# Patient Record
Sex: MALE | Race: ASIAN | NOT HISPANIC OR LATINO | URBAN - METROPOLITAN AREA
[De-identification: names, ages, dates, MRNs, and addresses within clinical notes are randomized per-mention and may not be internally consistent; named-entity substitution may affect disease eponyms.]

---

## 2017-10-18 ENCOUNTER — EMERGENCY (EMERGENCY)
Facility: HOSPITAL | Age: 62
LOS: 1 days | End: 2017-10-18
Attending: EMERGENCY MEDICINE
Payer: MEDICAID

## 2017-10-18 VITALS — OXYGEN SATURATION: 93 % | RESPIRATION RATE: 22 BRPM

## 2017-10-18 DIAGNOSIS — Z98.89 OTHER SPECIFIED POSTPROCEDURAL STATES: Chronic | ICD-10-CM

## 2017-10-18 DIAGNOSIS — I46.9 CARDIAC ARREST, CAUSE UNSPECIFIED: ICD-10-CM

## 2017-10-18 LAB
ALBUMIN SERPL ELPH-MCNC: 2.1 G/DL — LOW (ref 3.3–5)
ALBUMIN SERPL ELPH-MCNC: 3.3 G/DL — SIGNIFICANT CHANGE UP (ref 3.3–5)
ALP SERPL-CCNC: 72 U/L — SIGNIFICANT CHANGE UP (ref 40–120)
ALP SERPL-CCNC: 82 U/L — SIGNIFICANT CHANGE UP (ref 40–120)
ALT FLD-CCNC: 1029 U/L — HIGH (ref 4–41)
ALT FLD-CCNC: 886 U/L — HIGH (ref 4–41)
APTT BLD: 110 SEC — HIGH (ref 27.5–37.4)
AST SERPL-CCNC: 1260 U/L — HIGH (ref 4–40)
AST SERPL-CCNC: 1432 U/L — HIGH (ref 4–40)
BASE EXCESS BLDA CALC-SCNC: -14.5 MMOL/L — SIGNIFICANT CHANGE UP
BASE EXCESS BLDV CALC-SCNC: -11.8 MMOL/L — SIGNIFICANT CHANGE UP
BASE EXCESS BLDV CALC-SCNC: -21.8 MMOL/L — SIGNIFICANT CHANGE UP
BASOPHILS # BLD AUTO: 0.04 K/UL — SIGNIFICANT CHANGE UP (ref 0–0.2)
BASOPHILS NFR BLD AUTO: 0.8 % — SIGNIFICANT CHANGE UP (ref 0–2)
BILIRUB SERPL-MCNC: 0.8 MG/DL — SIGNIFICANT CHANGE UP (ref 0.2–1.2)
BILIRUB SERPL-MCNC: 1.1 MG/DL — SIGNIFICANT CHANGE UP (ref 0.2–1.2)
BLOOD GAS VENOUS - CREATININE: 0.92 MG/DL — SIGNIFICANT CHANGE UP (ref 0.5–1.3)
BLOOD GAS VENOUS - CREATININE: 0.97 MG/DL — SIGNIFICANT CHANGE UP (ref 0.5–1.3)
BUN SERPL-MCNC: 14 MG/DL — SIGNIFICANT CHANGE UP (ref 7–23)
BUN SERPL-MCNC: 17 MG/DL — SIGNIFICANT CHANGE UP (ref 7–23)
CALCIUM SERPL-MCNC: 10.6 MG/DL — HIGH (ref 8.4–10.5)
CALCIUM SERPL-MCNC: 10.9 MG/DL — HIGH (ref 8.4–10.5)
CHLORIDE BLDA-SCNC: 106 MMOL/L — SIGNIFICANT CHANGE UP (ref 96–108)
CHLORIDE BLDV-SCNC: 104 MMOL/L — SIGNIFICANT CHANGE UP (ref 96–108)
CHLORIDE BLDV-SCNC: 117 MMOL/L — HIGH (ref 96–108)
CHLORIDE SERPL-SCNC: 104 MMOL/L — SIGNIFICANT CHANGE UP (ref 98–107)
CHLORIDE SERPL-SCNC: 93 MMOL/L — LOW (ref 98–107)
CK MB BLD-MCNC: 13.37 NG/ML — HIGH (ref 1–6.6)
CK SERPL-CCNC: 352 U/L — HIGH (ref 30–200)
CO2 SERPL-SCNC: 6 MMOL/L — CRITICAL LOW (ref 22–31)
CO2 SERPL-SCNC: 8 MMOL/L — CRITICAL LOW (ref 22–31)
CREAT BLDA-MCNC: 1.4 MG/DL — HIGH (ref 0.5–1.3)
CREAT SERPL-MCNC: 1.24 MG/DL — SIGNIFICANT CHANGE UP (ref 0.5–1.3)
CREAT SERPL-MCNC: 1.47 MG/DL — HIGH (ref 0.5–1.3)
EOSINOPHIL # BLD AUTO: 0.02 K/UL — SIGNIFICANT CHANGE UP (ref 0–0.5)
EOSINOPHIL NFR BLD AUTO: 0.4 % — SIGNIFICANT CHANGE UP (ref 0–6)
GAS PNL BLDV: 134 MMOL/L — LOW (ref 136–146)
GAS PNL BLDV: 140 MMOL/L — SIGNIFICANT CHANGE UP (ref 136–146)
GLUCOSE BLDA-MCNC: 534 MG/DL — CRITICAL HIGH (ref 70–99)
GLUCOSE BLDV-MCNC: 188 — HIGH (ref 70–99)
GLUCOSE BLDV-MCNC: 340 — HIGH (ref 70–99)
GLUCOSE SERPL-MCNC: 286 MG/DL — HIGH (ref 70–99)
GLUCOSE SERPL-MCNC: 376 MG/DL — HIGH (ref 70–99)
HCO3 BLDA-SCNC: 12 MMOL/L — LOW (ref 22–26)
HCO3 BLDV-SCNC: 13 MMOL/L — LOW (ref 20–27)
HCO3 BLDV-SCNC: 8 MMOL/L — LOW (ref 20–27)
HCT VFR BLD CALC: 37.2 % — LOW (ref 39–50)
HCT VFR BLDA CALC: 28 % — LOW (ref 39–51)
HCT VFR BLDV CALC: 23.1 % — LOW (ref 39–51)
HCT VFR BLDV CALC: 36.9 % — LOW (ref 39–51)
HGB BLD-MCNC: 11 G/DL — LOW (ref 13–17)
HGB BLDA-MCNC: 9 G/DL — LOW (ref 13–17)
HGB BLDV-MCNC: 12 G/DL — LOW (ref 13–17)
HGB BLDV-MCNC: 7.4 G/DL — LOW (ref 13–17)
IMM GRANULOCYTES # BLD AUTO: 0.3 # — SIGNIFICANT CHANGE UP
IMM GRANULOCYTES NFR BLD AUTO: 5.7 % — HIGH (ref 0–1.5)
INR BLD: 1.17 — SIGNIFICANT CHANGE UP (ref 0.88–1.17)
LACTATE BLDA-SCNC: 18 MMOL/L — CRITICAL HIGH (ref 0.5–2)
LACTATE BLDV-MCNC: 15 MMOL/L — CRITICAL HIGH (ref 0.5–2)
LACTATE BLDV-MCNC: 9.8 MMOL/L — CRITICAL HIGH (ref 0.5–2)
LYMPHOCYTES # BLD AUTO: 3.27 K/UL — SIGNIFICANT CHANGE UP (ref 1–3.3)
LYMPHOCYTES # BLD AUTO: 62.6 % — HIGH (ref 13–44)
MCHC RBC-ENTMCNC: 29.6 % — LOW (ref 32–36)
MCHC RBC-ENTMCNC: 30.7 PG — SIGNIFICANT CHANGE UP (ref 27–34)
MCV RBC AUTO: 103.9 FL — HIGH (ref 80–100)
MONOCYTES # BLD AUTO: 0.27 K/UL — SIGNIFICANT CHANGE UP (ref 0–0.9)
MONOCYTES NFR BLD AUTO: 5.2 % — SIGNIFICANT CHANGE UP (ref 2–14)
NEUTROPHILS # BLD AUTO: 1.32 K/UL — LOW (ref 1.8–7.4)
NEUTROPHILS NFR BLD AUTO: 25.3 % — LOW (ref 43–77)
NRBC # FLD: 0.06 — SIGNIFICANT CHANGE UP
NRBC FLD-RTO: 1.1 — SIGNIFICANT CHANGE UP
PCO2 BLDA: 66 MMHG — HIGH (ref 35–48)
PCO2 BLDV: 56 MMHG — HIGH (ref 41–51)
PCO2 BLDV: 74 MMHG — HIGH (ref 41–51)
PH BLDA: 6.99 PH — CRITICAL LOW (ref 7.35–7.45)
PH BLDV: 6.85 PH — CRITICAL LOW (ref 7.32–7.43)
PH BLDV: 7.01 PH — CRITICAL LOW (ref 7.32–7.43)
PLATELET # BLD AUTO: 96 K/UL — LOW (ref 150–400)
PLATELET COUNT - ESTIMATE: SIGNIFICANT CHANGE UP
PMV BLD: 11.1 FL — SIGNIFICANT CHANGE UP (ref 7–13)
PO2 BLDA: 35 MMHG — CRITICAL LOW (ref 83–108)
PO2 BLDV: 37 MMHG — SIGNIFICANT CHANGE UP (ref 35–40)
PO2 BLDV: 69 MMHG — HIGH (ref 35–40)
POTASSIUM BLDA-SCNC: 5.5 MMOL/L — HIGH (ref 3.4–4.5)
POTASSIUM BLDV-SCNC: 5.4 MMOL/L — HIGH (ref 3.4–4.5)
POTASSIUM BLDV-SCNC: 5.6 MMOL/L — HIGH (ref 3.4–4.5)
POTASSIUM SERPL-MCNC: 6.9 MMOL/L — CRITICAL HIGH (ref 3.5–5.3)
POTASSIUM SERPL-MCNC: 8.7 MMOL/L — CRITICAL HIGH (ref 3.5–5.3)
POTASSIUM SERPL-SCNC: 6.9 MMOL/L — CRITICAL HIGH (ref 3.5–5.3)
POTASSIUM SERPL-SCNC: 8.7 MMOL/L — CRITICAL HIGH (ref 3.5–5.3)
PROT SERPL-MCNC: 3.8 G/DL — LOW (ref 6–8.3)
PROT SERPL-MCNC: 6 G/DL — SIGNIFICANT CHANGE UP (ref 6–8.3)
PROTHROM AB SERPL-ACNC: 13.1 SEC — SIGNIFICANT CHANGE UP (ref 9.8–13.1)
RBC # BLD: 3.58 M/UL — LOW (ref 4.2–5.8)
RBC # FLD: 16.6 % — HIGH (ref 10.3–14.5)
SAO2 % BLDA: 32.4 % — LOW (ref 95–99)
SAO2 % BLDV: 47 % — LOW (ref 60–85)
SAO2 % BLDV: 71.2 % — SIGNIFICANT CHANGE UP (ref 60–85)
SODIUM BLDA-SCNC: 139 MMOL/L — SIGNIFICANT CHANGE UP (ref 136–146)
SODIUM SERPL-SCNC: 142 MMOL/L — SIGNIFICANT CHANGE UP (ref 135–145)
SODIUM SERPL-SCNC: 142 MMOL/L — SIGNIFICANT CHANGE UP (ref 135–145)
TROPONIN T SERPL-MCNC: < 0.06 NG/ML — SIGNIFICANT CHANGE UP (ref 0–0.06)
WBC # BLD: 5.22 K/UL — SIGNIFICANT CHANGE UP (ref 3.8–10.5)
WBC # FLD AUTO: 5.22 K/UL — SIGNIFICANT CHANGE UP (ref 3.8–10.5)

## 2017-10-18 PROCEDURE — 71010: CPT | Mod: 26

## 2017-10-18 PROCEDURE — 93010 ELECTROCARDIOGRAM REPORT: CPT

## 2017-10-18 PROCEDURE — 99285 EMERGENCY DEPT VISIT HI MDM: CPT | Mod: 25

## 2017-10-18 RX ORDER — EPINEPHRINE 0.3 MG/.3ML
1 INJECTION INTRAMUSCULAR; SUBCUTANEOUS ONCE
Qty: 0 | Refills: 0 | Status: COMPLETED | OUTPATIENT
Start: 2017-10-18 | End: 2017-10-18

## 2017-10-18 RX ORDER — NOREPINEPHRINE BITARTRATE/D5W 8 MG/250ML
0.1 PLASTIC BAG, INJECTION (ML) INTRAVENOUS
Qty: 8 | Refills: 0 | Status: DISCONTINUED | OUTPATIENT
Start: 2017-10-18 | End: 2017-10-18

## 2017-10-18 RX ORDER — ATROPINE SULFATE 0.1 MG/ML
0.5 SYRINGE (ML) INJECTION ONCE
Qty: 0 | Refills: 0 | Status: COMPLETED | OUTPATIENT
Start: 2017-10-18 | End: 2017-10-18

## 2017-10-18 RX ORDER — CALCIUM GLUCONATE 100 MG/ML
1 VIAL (ML) INTRAVENOUS ONCE
Qty: 0 | Refills: 0 | Status: COMPLETED | OUTPATIENT
Start: 2017-10-18 | End: 2017-10-18

## 2017-10-18 RX ORDER — SODIUM CHLORIDE 9 MG/ML
2000 INJECTION INTRAMUSCULAR; INTRAVENOUS; SUBCUTANEOUS ONCE
Qty: 0 | Refills: 0 | Status: COMPLETED | OUTPATIENT
Start: 2017-10-18 | End: 2017-10-18

## 2017-10-18 RX ORDER — CALCIUM CHLORIDE
1000 POWDER (GRAM) MISCELLANEOUS ONCE
Qty: 0 | Refills: 0 | Status: COMPLETED | OUTPATIENT
Start: 2017-10-18 | End: 2017-10-18

## 2017-10-18 RX ORDER — DEXTROSE 50 % IN WATER 50 %
50 SYRINGE (ML) INTRAVENOUS ONCE
Qty: 0 | Refills: 0 | Status: COMPLETED | OUTPATIENT
Start: 2017-10-18 | End: 2017-10-18

## 2017-10-18 RX ORDER — INSULIN HUMAN 100 [IU]/ML
5 INJECTION, SOLUTION SUBCUTANEOUS ONCE
Qty: 0 | Refills: 0 | Status: COMPLETED | OUTPATIENT
Start: 2017-10-18 | End: 2017-10-18

## 2017-10-18 RX ORDER — AMIODARONE HYDROCHLORIDE 400 MG/1
0.5 TABLET ORAL
Qty: 450 | Refills: 0 | Status: DISCONTINUED | OUTPATIENT
Start: 2017-10-19 | End: 2017-10-18

## 2017-10-18 RX ORDER — SODIUM BICARBONATE 1 MEQ/ML
100 SYRINGE (ML) INTRAVENOUS ONCE
Qty: 0 | Refills: 0 | Status: COMPLETED | OUTPATIENT
Start: 2017-10-18 | End: 2017-10-18

## 2017-10-18 RX ORDER — INFLUENZA VIRUS VACCINE 15; 15; 15; 15 UG/.5ML; UG/.5ML; UG/.5ML; UG/.5ML
0.5 SUSPENSION INTRAMUSCULAR ONCE
Qty: 0 | Refills: 0 | Status: DISCONTINUED | OUTPATIENT
Start: 2017-10-18 | End: 2017-10-18

## 2017-10-18 RX ORDER — NOREPINEPHRINE BITARTRATE/D5W 8 MG/250ML
0.1 PLASTIC BAG, INJECTION (ML) INTRAVENOUS
Qty: 16 | Refills: 0 | Status: DISCONTINUED | OUTPATIENT
Start: 2017-10-18 | End: 2017-10-18

## 2017-10-18 RX ORDER — SODIUM BICARBONATE 1 MEQ/ML
50 SYRINGE (ML) INTRAVENOUS ONCE
Qty: 0 | Refills: 0 | Status: COMPLETED | OUTPATIENT
Start: 2017-10-18 | End: 2017-10-18

## 2017-10-18 RX ORDER — CALCIUM GLUCONATE 100 MG/ML
1 VIAL (ML) INTRAVENOUS ONCE
Qty: 0 | Refills: 0 | Status: DISCONTINUED | OUTPATIENT
Start: 2017-10-18 | End: 2017-10-18

## 2017-10-18 RX ORDER — AMIODARONE HYDROCHLORIDE 400 MG/1
1 TABLET ORAL
Qty: 450 | Refills: 0 | Status: DISCONTINUED | OUTPATIENT
Start: 2017-10-18 | End: 2017-10-18

## 2017-10-18 RX ADMIN — Medication 100 MILLIEQUIVALENT(S): at 18:00

## 2017-10-18 RX ADMIN — Medication 50 MILLIEQUIVALENT(S): at 17:31

## 2017-10-18 RX ADMIN — Medication 0.5 MILLIGRAM(S): at 16:22

## 2017-10-18 RX ADMIN — EPINEPHRINE 1 MILLIGRAM(S): 0.3 INJECTION INTRAMUSCULAR; SUBCUTANEOUS at 16:18

## 2017-10-18 RX ADMIN — INSULIN HUMAN 5 UNIT(S): 100 INJECTION, SOLUTION SUBCUTANEOUS at 17:20

## 2017-10-18 RX ADMIN — Medication 50 MILLILITER(S): at 17:20

## 2017-10-18 RX ADMIN — Medication 13.12 MICROGRAM(S)/KG/MIN: at 17:29

## 2017-10-18 RX ADMIN — SODIUM CHLORIDE 2000 MILLILITER(S): 9 INJECTION INTRAMUSCULAR; INTRAVENOUS; SUBCUTANEOUS at 16:18

## 2017-10-18 RX ADMIN — Medication 1000 MILLIGRAM(S): at 17:18

## 2017-10-18 RX ADMIN — Medication 6.56 MICROGRAM(S)/KG/MIN: at 19:25

## 2017-10-18 RX ADMIN — Medication 1000 MILLIGRAM(S): at 17:31

## 2017-10-18 NOTE — H&P ADULT - PSH
H/O non-cataract eye surgery  History of Laser Surfgery. Pt uunsuer of type. States that he was bleeding in Lt eye.  H/O shoulder surgery  Lt shoulder surgery s/p clavicular fracture.

## 2017-10-18 NOTE — ED PROVIDER NOTE - OBJECTIVE STATEMENT
62M w/ PMH of DM, MIx2, seizure disorder (per family is 2/2 diabetes and etoh?, however pt on dilantin), EtOH abuse and withdrawal seizures, BIBEMS in cardiac arrest.  Per daughter, patient was in usual state of health when she spoke to him via phone at 10am today. Pt drinks 3-5 drinks vodka daily but did not drink today. No other deviations from his regular day., however had not had any alcohol today.  Pt found down on ground seizing by his mother's hha. HHA called EMS and initiated compressions.  EMS arrived within 15 minutes and patient was found to be in asystole.  He was intubated and chest compressions were continued.  In the field, patient received epinephrine x 5, calcium, bicarbonate, was intubated and had a left io line placed.  He had one episode of VTach during transport and was shocked x 1.  Upon arrival to the ED, chest compressions were still continuing, he was given epinephrine x 1, calcium x 1 with subsequent ROSC. After ROSC peaked T waves noted on monitor. Pt given another calcium, bicarb x 3, insulin, glucose. patient was started on levophed.  Patient not awake, not on any sedation.  Pupils not reactive. Total down time around 1 hour.

## 2017-10-18 NOTE — ED ADULT TRIAGE NOTE - CHIEF COMPLAINT QUOTE
BIBEMS to TR B in cardiac arrest.  As per EMS, family states that pt was having witnessed seizure-like activity and then went into arrest. CPR started by family. EMS states that pts downtime was 55min. Given Epi & Ca gluc on route. Rhythm with EMS was asystole. Arrives with compressions in progress, intubated ETT 7, 24 at [facility].

## 2017-10-18 NOTE — H&P ADULT - NSHPLABSRESULTS_GEN_ALL_CORE
11.0   5.22  )-----------( 96       ( 18 Oct 2017 16:28 )             37.2       10-18    142  |  93<L>  |  17  ----------------------------<  286<H>  8.7<HH>   |  8<LL>  |  1.47<H>    Ca    10.6<H>      18 Oct 2017 16:28    TPro  6.0  /  Alb  3.3  /  TBili  1.1  /  DBili  x   /  AST  1260<H>  /  ALT  886<H>  /  AlkPhos  82  10-18      LIVER FUNCTIONS - ( 18 Oct 2017 16:28 )  Alb: 3.3 g/dL / Pro: 6.0 g/dL / ALK PHOS: 82 u/L / ALT: 886 u/L / AST: 1260 u/L / GGT: x

## 2017-10-18 NOTE — ED ADULT NURSE NOTE - OBJECTIVE STATEMENT
pt BIBEMS in asystolic cardiac arrest. as per EMS pt was wittnessed to possible have a "seizure" then went into wittnessed cardiac arrest, CPR started by family Prior to EMS arrival. As per EMS pt had brief period of vfib however was asystolic for most of EMS care, pt received several epi's, bicarb and calcium via EMS with no ROSC. pt was intubated via EMS ETT 24 at lip line currently with + LS and + ETCO2 waveform 18 . Pt given one epi, one bicarb and one calcium (SEE MAR) with ROSC. ETCO2 improved into the 30's. with + carotid pulses. pt remains hypotensive, titrating norepi drip, see MAR>

## 2017-10-18 NOTE — ED ADULT NURSE NOTE - CHIEF COMPLAINT QUOTE
BIBEMS to TR B in cardiac arrest.  As per EMS, family states that pt was having witnessed seizure-like activity and then went into arrest. CPR started by family. EMS states that pts downtime was 55min. Given Epi & Ca gluc on route. Rhythm with EMS was asystole. Arrives with compressions in progress, intubated ETT 7, 24 at [facility].

## 2017-10-18 NOTE — ED PROVIDER NOTE - MEDICAL DECISION MAKING DETAILS
cardiac arrest likely in setting in seizure possibly in setting of etoh withdrawal->resuscitate, ct head, cxr, micu

## 2017-10-18 NOTE — ED PROVIDER NOTE - ATTENDING CONTRIBUTION TO CARE
Alexis  received pt in cardiac arrest CPR in progress  intubated in field  given epix 5 acc [facility]    here  good sounds b/l  tube visualized in place  given epi x 1  continued CPR  ventilation  pulse returned w/in 5 min of arrivall  (bradycardic)  given dose of atropine   fluids    BP remained low  IV levophed begun    given endy  HCO3  initially- new wide complex  RBBB     complex shifted to wider rhythm more suggestive of hyperkalemia  given initial calcium  bicarb and insulin  D50   repeat labs  worsening lactate (due to prolonged down time reported   also likely cause of hyperkalemia  )  initial bicarb given  CXR  tube in place  no infiltrate seen   evaluated and accepted to ICU  By7 story  may have seized and aspirated   reported prolonged CPR

## 2017-10-18 NOTE — H&P ADULT - ASSESSMENT
62M w/ PMH of seizure disorder (unclear whether structural or related to alcohol use), EtOH abuse, h/o intubation in 2011 2/2 seizures from alcohol withdrawal/hyponatremia, BIBEMS in cardiac arrest, likely caused by anoxia 2/2 seizures possibly due to alcohol withdrawal.     # NEURO:   - no sedation, not awake or alert    # PULM:   - intubated, no apparent structural lung disease or infection leading to hypoxia    # CV:   - currently hemodynamically stable on levophed   - EKG with peaked T-waves likely 2/2 hyperkalemia 2/2 cardiac arrest, will give bicarbonate, calcium    # GI:   - no issues    # NEURO:   - will check CT Head to rule out stroke or bleed causing cardiac arrest  - likely severe anoxic brain injury 2/2 down time    # ETHICS:   - very poor prognosis, grave situation explained to family in detail  - remains FULL CODE      Magda Perez MD  PGY-3 62M w/ PMH of seizure disorder (unclear whether structural or related to alcohol use), EtOH abuse, h/o intubation in 2011 2/2 seizures from alcohol withdrawal/hyponatremia, BIBEMS in cardiac arrest, likely caused by anoxia 2/2 seizures possibly due to alcohol withdrawal.     # NEURO:   - no sedation, not awake or alert  - Seizures: likely in the setting of alcohol use only, h/o intubation 2/2 seizures, reportedly on dilatin at home     # PULM:   - intubated, no apparent structural lung disease or infection leading to hypoxia    # CV:   - currently hemodynamically stable on levophed   - EKG with peaked T-waves likely 2/2 hyperkalemia 2/2 cardiac arrest, will give bicarbonate, calcium    # GI:   - no issues    # METABOLIC:   - severe hyperkalemia likely 2/2 cardiac arrest, treated with bicarb and calcium in the ED, will repeat labs     # NEURO:   - will check CT Head to rule out stroke or bleed causing cardiac arrest  - likely severe anoxic brain injury 2/2 down time    # ETHICS:   - very poor prognosis, grave situation explained to family in detail  - remains FULL CODE      Magda Perez MD  PGY-3

## 2017-10-18 NOTE — H&P ADULT - PMH
Alcoholism    Clavicle Fracture    Diabetes    EP (Epilepsy)  Saw Neurologist in Oct 2015, no medications given  Hypertension    Peripheral vascular disease of extremity

## 2017-10-18 NOTE — CHART NOTE - NSCHARTNOTEFT_GEN_A_CORE
MICU Event Note    Spoke extensively with patient's family (daughter, sister, brother) about grave prognosis.  Patient on 100% FiO2, however persistent decreasing oxygen saturation.     Patient became asystolic on monitor and lost pulseox.  On physical exam, patient had no cardiac or pulmonary sounds.  Pupils were fixed and dilated.  No spontaneous respirations.   Patient was pronounced dead, TOD 8:02pm.   Family at bedside.  Attending notified.      Magda Perez MD  PGY-3

## 2017-10-18 NOTE — H&P ADULT - HISTORY OF PRESENT ILLNESS
62M w/ PMH of seizure disorder (unclear whether structural or related to alcohol use), EtOH abuse, h/o intubation in 2011 2/2 seizures from alcohol withdrawal/hyponatremia, BIBEMS in cardiac arrest.  History provided by daughter and brother.  Per daughter, patient was feeling fine today, she last spoke to him on the morning of admission - states that  he was well, no complaints.  He usually drinks daily, however had not had any alcohol today.  Reports that patient lives with his mother and that he was found down by his mother's home health aide.  She called EMS and initiated compressions.  EMS arrived within 15 minutes and patient was found to be in asystole.  He was intubated and chest compressions were continued.  In the field, patient received epinephrine x 5, calcium, bicarbonate.  He had one episode of VTach during transport and was shocked x 1.  Upon arrival to the ED, chest compressions were still continuing, he was given epinephrine x 1 and calcium.  ROSC was achieved in the ED and patient was started on levophed.  Patient not awake, not on any sedation.  Total down time around 1 hour.     Per family, last seizure was months ago.  His seizures are usually in the setting of drinking too much alcohol or withdrawal.  He is on dilantin and daughter thinks that he is compliant.  Does not follow with a neurologist.  Has been intubated prior due to alchohol related seizures, but has never had a cardiac arrest.

## 2017-10-18 NOTE — H&P ADULT - ATTENDING COMMENTS
62M w/ PMH of seizure disorder, EtOH abuse, h/o intubation in 2011 2/2 seizures from alcohol withdrawal/hyponatremia, p/w cardiac arrest, likely caused by anoxia 2/2 seizures possibly due to alcohol withdrawal.   cardiopulm arrest with significant downtown  likely anoxic brain injury  guarded

## 2017-10-18 NOTE — DISCHARGE NOTE FOR THE EXPIRED PATIENT - HOSPITAL COURSE
HPI:     62M w/ PMH of seizure disorder (unclear whether structural or related to alcohol use), EtOH abuse, h/o intubation in 2011 2/2 seizures from alcohol withdrawal/hyponatremia, BIBEMS in cardiac arrest.  History provided by daughter and brother.  Per daughter, patient was feeling fine today, she last spoke to him on the morning of admission - states that  he was well, no complaints.  He usually drinks daily, however had not had any alcohol today.  Reports that patient lives with his mother and that he was found down by his mother's home health aide.  She called EMS and initiated compressions.  EMS arrived within 15 minutes and patient was found to be in asystole.  He was intubated and chest compressions were continued.  In the field, patient received epinephrine x 5, calcium, bicarbonate.  He had one episode of VTach during transport and was shocked x 1.  Upon arrival to the ED, chest compressions were still continuing, he was given epinephrine x 1 and calcium.  ROSC was achieved in the ED and patient was started on levophed.  Patient not awake, not on any sedation.  Total down time around 1 hour.       Hospital Course:   Patient had second cardiac arrest in MICU upon arrival.  Epinephrine was given, VTach on monitor, shocked x 3.  ROSC achieved.  Patient was made DNR by family.      Spoke extensively with patient's family (daughter, sister, brother) about grave prognosis.  Patient on 100% FiO2, however persistent decreasing oxygen saturation.   Patient became asystolic on monitor and lost pulseox.  On physical exam, patient had no cardiac or pulmonary sounds.  Pupils were fixed and dilated.  No spontaneous respirations. Patient was pronounced dead, TOD 8:02pm.   Family at bedside.  Attending notified.

## 2017-10-18 NOTE — ED ADULT NURSE REASSESSMENT NOTE - NS ED NURSE REASSESS COMMENT FT1
pt continues to drop BP despite levo, levo increased (see MAR), pt brought to ICU by ICU RN Leilani. repeat labs shows worsening lactate count as well as worsening PH. repeat bicarb given (see mar)

## 2017-10-18 NOTE — H&P ADULT - FAMILY HISTORY
Father  Still living? Unknown  Family history of heart disease, Age at diagnosis: Age Unknown  Family history of hypertension, Age at diagnosis: Age Unknown  Family history of diabetes mellitus, Age at diagnosis: Age Unknown

## 2017-10-18 NOTE — H&P ADULT - NSHPPHYSICALEXAM_GEN_ALL_CORE
GEN: intubated, not on sedation but not following commands  HEENT: NCAT, ET tube in place, pupils fixed a dilated  CV: RR GEN: intubated, not on sedation but not following commands  HEENT: NCAT, ET tube in place, pupils fixed a dilated  CV: regular rate and rhythm   PULM: ventilator breath sounds, no wheezes  GI: distended abdomen, soft, hypoactive bowel sounds  EXT: no pitting edema, +IO in right leg  : + julien  NEURO: not following commands, not awake or alert
